# Patient Record
Sex: FEMALE | Race: WHITE | NOT HISPANIC OR LATINO
[De-identification: names, ages, dates, MRNs, and addresses within clinical notes are randomized per-mention and may not be internally consistent; named-entity substitution may affect disease eponyms.]

---

## 2017-05-31 ENCOUNTER — APPOINTMENT (OUTPATIENT)
Age: 57
End: 2017-05-31

## 2017-06-05 ENCOUNTER — APPOINTMENT (OUTPATIENT)
Age: 57
End: 2017-06-05

## 2017-06-05 VITALS
HEIGHT: 64 IN | WEIGHT: 127 LBS | TEMPERATURE: 98.1 F | SYSTOLIC BLOOD PRESSURE: 148 MMHG | HEART RATE: 86 BPM | RESPIRATION RATE: 16 BRPM | BODY MASS INDEX: 21.68 KG/M2 | DIASTOLIC BLOOD PRESSURE: 69 MMHG

## 2017-06-05 DIAGNOSIS — B18.2 CHRONIC VIRAL HEPATITIS C: ICD-10-CM

## 2017-09-14 ENCOUNTER — CHART COPY (OUTPATIENT)
Age: 57
End: 2017-09-14

## 2017-12-13 ENCOUNTER — APPOINTMENT (OUTPATIENT)
Age: 57
End: 2017-12-13
Payer: COMMERCIAL

## 2017-12-13 VITALS
HEIGHT: 64 IN | HEART RATE: 99 BPM | WEIGHT: 126 LBS | SYSTOLIC BLOOD PRESSURE: 178 MMHG | TEMPERATURE: 98.2 F | BODY MASS INDEX: 21.51 KG/M2 | DIASTOLIC BLOOD PRESSURE: 103 MMHG | RESPIRATION RATE: 17 BRPM

## 2017-12-13 PROCEDURE — 99214 OFFICE O/P EST MOD 30 MIN: CPT

## 2017-12-13 RX ORDER — LISINOPRIL 30 MG/1
30 TABLET ORAL
Qty: 30 | Refills: 0 | Status: DISCONTINUED | COMMUNITY
Start: 2017-08-09 | End: 2017-12-13

## 2017-12-13 RX ORDER — LISINOPRIL 40 MG/1
40 TABLET ORAL
Qty: 30 | Refills: 0 | Status: ACTIVE | COMMUNITY
Start: 2017-11-09

## 2018-06-20 ENCOUNTER — APPOINTMENT (OUTPATIENT)
Dept: HEPATOLOGY | Facility: CLINIC | Age: 58
End: 2018-06-20
Payer: COMMERCIAL

## 2018-06-20 VITALS
HEART RATE: 70 BPM | TEMPERATURE: 98.4 F | HEIGHT: 64 IN | OXYGEN SATURATION: 97 % | SYSTOLIC BLOOD PRESSURE: 151 MMHG | BODY MASS INDEX: 21.51 KG/M2 | WEIGHT: 126 LBS | RESPIRATION RATE: 17 BRPM | DIASTOLIC BLOOD PRESSURE: 80 MMHG

## 2018-06-20 PROCEDURE — 99214 OFFICE O/P EST MOD 30 MIN: CPT

## 2018-08-28 ENCOUNTER — OUTPATIENT (OUTPATIENT)
Dept: OUTPATIENT SERVICES | Facility: HOSPITAL | Age: 58
LOS: 1 days | End: 2018-08-28
Payer: COMMERCIAL

## 2018-08-28 ENCOUNTER — APPOINTMENT (OUTPATIENT)
Dept: HEPATOLOGY | Facility: HOSPITAL | Age: 58
End: 2018-08-28

## 2018-08-28 DIAGNOSIS — K74.60 UNSPECIFIED CIRRHOSIS OF LIVER: ICD-10-CM

## 2018-08-28 PROCEDURE — 43235 EGD DIAGNOSTIC BRUSH WASH: CPT

## 2018-08-28 PROCEDURE — 43235 EGD DIAGNOSTIC BRUSH WASH: CPT | Mod: GC

## 2018-09-24 ENCOUNTER — APPOINTMENT (OUTPATIENT)
Dept: HEPATOLOGY | Facility: CLINIC | Age: 58
End: 2018-09-24
Payer: COMMERCIAL

## 2018-09-24 VITALS
SYSTOLIC BLOOD PRESSURE: 143 MMHG | RESPIRATION RATE: 16 BRPM | HEART RATE: 69 BPM | TEMPERATURE: 98 F | OXYGEN SATURATION: 91 % | DIASTOLIC BLOOD PRESSURE: 83 MMHG | HEIGHT: 62.5 IN | WEIGHT: 126 LBS | BODY MASS INDEX: 22.61 KG/M2

## 2018-09-24 PROCEDURE — 99214 OFFICE O/P EST MOD 30 MIN: CPT

## 2018-12-03 ENCOUNTER — MED ADMIN CHARGE (OUTPATIENT)
Age: 58
End: 2018-12-03

## 2018-12-19 ENCOUNTER — APPOINTMENT (OUTPATIENT)
Dept: HEPATOLOGY | Facility: CLINIC | Age: 58
End: 2018-12-19
Payer: COMMERCIAL

## 2018-12-19 VITALS
DIASTOLIC BLOOD PRESSURE: 88 MMHG | RESPIRATION RATE: 16 BRPM | HEIGHT: 62.5 IN | SYSTOLIC BLOOD PRESSURE: 154 MMHG | HEART RATE: 73 BPM | TEMPERATURE: 98.2 F

## 2018-12-19 PROCEDURE — 91200 LIVER ELASTOGRAPHY: CPT

## 2018-12-19 PROCEDURE — ZZZZZ: CPT

## 2018-12-19 PROCEDURE — 99214 OFFICE O/P EST MOD 30 MIN: CPT | Mod: 25

## 2019-06-19 ENCOUNTER — APPOINTMENT (OUTPATIENT)
Dept: HEPATOLOGY | Facility: CLINIC | Age: 59
End: 2019-06-19
Payer: COMMERCIAL

## 2019-06-19 VITALS
HEART RATE: 71 BPM | BODY MASS INDEX: 21.53 KG/M2 | DIASTOLIC BLOOD PRESSURE: 81 MMHG | RESPIRATION RATE: 16 BRPM | WEIGHT: 120 LBS | TEMPERATURE: 98.2 F | SYSTOLIC BLOOD PRESSURE: 156 MMHG | HEIGHT: 62.5 IN

## 2019-06-19 PROCEDURE — 99214 OFFICE O/P EST MOD 30 MIN: CPT

## 2019-07-16 ENCOUNTER — APPOINTMENT (OUTPATIENT)
Dept: HEPATOLOGY | Facility: HOSPITAL | Age: 59
End: 2019-07-16

## 2019-07-16 ENCOUNTER — OUTPATIENT (OUTPATIENT)
Dept: OUTPATIENT SERVICES | Facility: HOSPITAL | Age: 59
LOS: 1 days | End: 2019-07-16
Payer: COMMERCIAL

## 2019-07-16 DIAGNOSIS — I85.00 ESOPHAGEAL VARICES WITHOUT BLEEDING: ICD-10-CM

## 2019-07-16 PROCEDURE — C1889: CPT

## 2019-07-16 PROCEDURE — 43244 EGD VARICES LIGATION: CPT

## 2019-07-16 PROCEDURE — 43244 EGD VARICES LIGATION: CPT | Mod: GC

## 2019-10-22 ENCOUNTER — OUTPATIENT (OUTPATIENT)
Dept: OUTPATIENT SERVICES | Facility: HOSPITAL | Age: 59
LOS: 1 days | End: 2019-10-22
Payer: COMMERCIAL

## 2019-10-22 ENCOUNTER — APPOINTMENT (OUTPATIENT)
Dept: HEPATOLOGY | Facility: HOSPITAL | Age: 59
End: 2019-10-22

## 2019-10-22 DIAGNOSIS — I85.00 ESOPHAGEAL VARICES WITHOUT BLEEDING: ICD-10-CM

## 2019-10-22 PROCEDURE — 43235 EGD DIAGNOSTIC BRUSH WASH: CPT | Mod: GC

## 2019-10-22 PROCEDURE — 43235 EGD DIAGNOSTIC BRUSH WASH: CPT

## 2019-10-25 ENCOUNTER — MEDICATION RENEWAL (OUTPATIENT)
Age: 59
End: 2019-10-25

## 2020-01-01 ENCOUNTER — MOBILE ON CALL (OUTPATIENT)
Age: 60
End: 2020-01-01

## 2020-01-23 ENCOUNTER — RX RENEWAL (OUTPATIENT)
Age: 60
End: 2020-01-23

## 2020-02-03 ENCOUNTER — APPOINTMENT (OUTPATIENT)
Dept: HEPATOLOGY | Facility: CLINIC | Age: 60
End: 2020-02-03

## 2020-03-06 ENCOUNTER — APPOINTMENT (OUTPATIENT)
Dept: HEPATOLOGY | Facility: CLINIC | Age: 60
End: 2020-03-06

## 2020-03-23 RX ORDER — CARVEDILOL 6.25 MG/1
6.25 TABLET, FILM COATED ORAL
Qty: 30 | Refills: 5 | Status: DISCONTINUED | COMMUNITY
Start: 2018-08-28 | End: 2020-03-23

## 2020-04-16 ENCOUNTER — TRANSCRIPTION ENCOUNTER (OUTPATIENT)
Age: 60
End: 2020-04-16

## 2020-04-16 ENCOUNTER — APPOINTMENT (OUTPATIENT)
Dept: HEPATOLOGY | Facility: CLINIC | Age: 60
End: 2020-04-16
Payer: COMMERCIAL

## 2020-04-16 PROCEDURE — 99214 OFFICE O/P EST MOD 30 MIN: CPT | Mod: 95

## 2020-04-16 RX ORDER — POTASSIUM GLUCONATE 595(99)MG
10 MCG TABLET ORAL
Refills: 0 | Status: ACTIVE | COMMUNITY
Start: 2020-04-16

## 2020-04-30 ENCOUNTER — APPOINTMENT (OUTPATIENT)
Dept: HEPATOLOGY | Facility: CLINIC | Age: 60
End: 2020-04-30

## 2020-12-05 ENCOUNTER — APPOINTMENT (OUTPATIENT)
Dept: DISASTER EMERGENCY | Facility: CLINIC | Age: 60
End: 2020-12-05

## 2020-12-06 LAB — SARS-COV-2 N GENE NPH QL NAA+PROBE: NOT DETECTED

## 2020-12-08 ENCOUNTER — APPOINTMENT (OUTPATIENT)
Dept: HEPATOLOGY | Facility: HOSPITAL | Age: 60
End: 2020-12-08

## 2021-03-13 ENCOUNTER — APPOINTMENT (OUTPATIENT)
Dept: DISASTER EMERGENCY | Facility: CLINIC | Age: 61
End: 2021-03-13

## 2021-03-14 LAB — SARS-COV-2 N GENE NPH QL NAA+PROBE: NOT DETECTED

## 2021-03-16 ENCOUNTER — OUTPATIENT (OUTPATIENT)
Dept: OUTPATIENT SERVICES | Facility: HOSPITAL | Age: 61
LOS: 1 days | Discharge: HOME | End: 2021-03-16
Payer: COMMERCIAL

## 2021-03-16 ENCOUNTER — APPOINTMENT (OUTPATIENT)
Dept: HEPATOLOGY | Facility: HOSPITAL | Age: 61
End: 2021-03-16

## 2021-03-16 VITALS
OXYGEN SATURATION: 98 % | RESPIRATION RATE: 17 BRPM | HEART RATE: 60 BPM | DIASTOLIC BLOOD PRESSURE: 70 MMHG | SYSTOLIC BLOOD PRESSURE: 120 MMHG

## 2021-03-16 VITALS
HEIGHT: 63 IN | OXYGEN SATURATION: 95 % | WEIGHT: 121.92 LBS | SYSTOLIC BLOOD PRESSURE: 139 MMHG | HEART RATE: 70 BPM | DIASTOLIC BLOOD PRESSURE: 74 MMHG | TEMPERATURE: 98 F | RESPIRATION RATE: 16 BRPM

## 2021-03-16 DIAGNOSIS — I85.00 ESOPHAGEAL VARICES WITHOUT BLEEDING: ICD-10-CM

## 2021-03-16 PROCEDURE — 43244 EGD VARICES LIGATION: CPT

## 2021-03-16 PROCEDURE — C1889: CPT

## 2021-03-16 PROCEDURE — 43244 EGD VARICES LIGATION: CPT | Mod: GC

## 2021-03-16 RX ORDER — SODIUM CHLORIDE 9 MG/ML
500 INJECTION INTRAMUSCULAR; INTRAVENOUS; SUBCUTANEOUS
Refills: 0 | Status: COMPLETED | OUTPATIENT
Start: 2021-03-16 | End: 2021-03-16

## 2021-03-16 RX ADMIN — SODIUM CHLORIDE 75 MILLILITER(S): 9 INJECTION INTRAMUSCULAR; INTRAVENOUS; SUBCUTANEOUS at 11:23

## 2021-03-16 NOTE — ASU PATIENT PROFILE, ADULT - PMH
Heart murmur    Hepatic cirrhosis, unspecified hepatic cirrhosis type, unspecified whether ascites present     Heart murmur    Hepatic cirrhosis, unspecified hepatic cirrhosis type, unspecified whether ascites present    Hypertension, unspecified type

## 2021-03-16 NOTE — ASU PREOP CHECKLIST - WARM FLUIDS/WARM BLANKETS
no
General Sunscreen Counseling: I recommended a broad spectrum sunscreen with a SPF of 30 or higher.  I explained that SPF 30 sunscreens block approximately 97 percent of the sun's harmful rays.  Sunscreens should be applied at least 15 minutes prior to expected sun exposure and then every 2 hours after that as long as sun exposure continues. If swimming or exercising sunscreen should be reapplied every 45 minutes to an hour after getting wet or sweating.  One ounce, or the equivalent of a shot glass full of sunscreen, is adequate to protect the skin not covered by a bathing suit. I also recommended a lip balm with a sunscreen as well. Sun protective clothing can be used in lieu of sunscreen but must be worn the entire time you are exposed to the sun's rays.
Products Recommended: [employer] as directed
Detail Level: Detailed

## 2021-03-22 ENCOUNTER — RX RENEWAL (OUTPATIENT)
Age: 61
End: 2021-03-22

## 2021-03-30 ENCOUNTER — RX RENEWAL (OUTPATIENT)
Age: 61
End: 2021-03-30

## 2021-05-18 PROBLEM — R01.1 CARDIAC MURMUR, UNSPECIFIED: Chronic | Status: ACTIVE | Noted: 2021-03-16

## 2021-05-18 PROBLEM — K74.60 UNSPECIFIED CIRRHOSIS OF LIVER: Chronic | Status: ACTIVE | Noted: 2021-03-16

## 2021-05-18 PROBLEM — I10 ESSENTIAL (PRIMARY) HYPERTENSION: Chronic | Status: ACTIVE | Noted: 2021-03-16

## 2021-06-23 ENCOUNTER — APPOINTMENT (OUTPATIENT)
Dept: HEPATOLOGY | Facility: CLINIC | Age: 61
End: 2021-06-23
Payer: COMMERCIAL

## 2021-06-23 VITALS
BODY MASS INDEX: 22.08 KG/M2 | DIASTOLIC BLOOD PRESSURE: 84 MMHG | HEIGHT: 62 IN | WEIGHT: 120 LBS | SYSTOLIC BLOOD PRESSURE: 142 MMHG | OXYGEN SATURATION: 97 % | HEART RATE: 65 BPM | TEMPERATURE: 98.2 F

## 2021-06-23 PROCEDURE — 99214 OFFICE O/P EST MOD 30 MIN: CPT

## 2021-06-23 PROCEDURE — 99072 ADDL SUPL MATRL&STAF TM PHE: CPT

## 2021-08-03 ENCOUNTER — RX RENEWAL (OUTPATIENT)
Age: 61
End: 2021-08-03

## 2021-08-21 ENCOUNTER — APPOINTMENT (OUTPATIENT)
Dept: DISASTER EMERGENCY | Facility: CLINIC | Age: 61
End: 2021-08-21

## 2021-08-21 DIAGNOSIS — Z01.818 ENCOUNTER FOR OTHER PREPROCEDURAL EXAMINATION: ICD-10-CM

## 2021-08-22 LAB — SARS-COV-2 N GENE NPH QL NAA+PROBE: NOT DETECTED

## 2021-08-24 ENCOUNTER — APPOINTMENT (OUTPATIENT)
Dept: HEPATOLOGY | Facility: HOSPITAL | Age: 61
End: 2021-08-24

## 2021-08-24 ENCOUNTER — OUTPATIENT (OUTPATIENT)
Dept: OUTPATIENT SERVICES | Facility: HOSPITAL | Age: 61
LOS: 1 days | End: 2021-08-24
Payer: COMMERCIAL

## 2021-08-24 VITALS
HEIGHT: 63 IN | HEART RATE: 76 BPM | RESPIRATION RATE: 12 BRPM | OXYGEN SATURATION: 97 % | DIASTOLIC BLOOD PRESSURE: 95 MMHG | WEIGHT: 121.92 LBS | SYSTOLIC BLOOD PRESSURE: 149 MMHG | TEMPERATURE: 97 F

## 2021-08-24 VITALS
SYSTOLIC BLOOD PRESSURE: 139 MMHG | HEART RATE: 64 BPM | OXYGEN SATURATION: 99 % | RESPIRATION RATE: 15 BRPM | DIASTOLIC BLOOD PRESSURE: 65 MMHG

## 2021-08-24 DIAGNOSIS — K74.60 UNSPECIFIED CIRRHOSIS OF LIVER: ICD-10-CM

## 2021-08-24 PROCEDURE — 43235 EGD DIAGNOSTIC BRUSH WASH: CPT | Mod: GC

## 2021-08-24 PROCEDURE — 43235 EGD DIAGNOSTIC BRUSH WASH: CPT

## 2021-08-24 RX ORDER — CARVEDILOL PHOSPHATE 80 MG/1
0 CAPSULE, EXTENDED RELEASE ORAL
Qty: 0 | Refills: 0 | DISCHARGE

## 2021-08-24 RX ORDER — LISINOPRIL 2.5 MG/1
1 TABLET ORAL
Qty: 0 | Refills: 0 | DISCHARGE

## 2021-08-24 RX ORDER — VALACYCLOVIR 500 MG/1
0 TABLET, FILM COATED ORAL
Qty: 0 | Refills: 0 | DISCHARGE

## 2021-08-24 RX ORDER — BENZOYL PEROXIDE MICRONIZED 5.8 %
0 TOWELETTE (EA) TOPICAL
Qty: 0 | Refills: 0 | DISCHARGE

## 2021-08-24 RX ORDER — SODIUM CHLORIDE 9 MG/ML
500 INJECTION INTRAMUSCULAR; INTRAVENOUS; SUBCUTANEOUS
Refills: 0 | Status: COMPLETED | OUTPATIENT
Start: 2021-08-24 | End: 2021-08-24

## 2021-08-24 RX ORDER — POTASSIUM CHLORIDE 20 MEQ
0 PACKET (EA) ORAL
Qty: 0 | Refills: 0 | DISCHARGE

## 2021-08-24 RX ORDER — CHOLECALCIFEROL (VITAMIN D3) 125 MCG
500 CAPSULE ORAL
Qty: 0 | Refills: 0 | DISCHARGE

## 2021-08-24 RX ADMIN — SODIUM CHLORIDE 75 MILLILITER(S): 9 INJECTION INTRAMUSCULAR; INTRAVENOUS; SUBCUTANEOUS at 12:22

## 2021-08-24 NOTE — PRE-ANESTHESIA EVALUATION ADULT - ANESTHESIA, PREVIOUS REACTION, PROFILE
Pt ambulated to triage with c/o   Chief Complaint   Patient presents with   • Abdominal Pain     upper abd into back and under ribs.  reports some SOB and chest pressure; pt has been having abd pain for months and has been getting treatment from PCP - started on pantoprazole, metronidazole and tetracycline    • N/V     no blood in vomit.      utilized #[id number].  Recent dx of gallstones.  Pt reports pain 5/10.  Called for EKG.      Pt Informed regarding triage process and verbalized understanding to inform triage tech or RN for any changes in condition. Placed in lobby.    
nausea/vomiting

## 2021-08-24 NOTE — ASU PATIENT PROFILE, ADULT - NSICDXPASTMEDICALHX_GEN_ALL_CORE_FT
PAST MEDICAL HISTORY:  Heart murmur     Hepatic cirrhosis, unspecified hepatic cirrhosis type, unspecified whether ascites present     Hypertension, unspecified type

## 2021-08-24 NOTE — PRE PROCEDURE NOTE - PRE PROCEDURE EVALUATION
Attending Physician:  Rebekah                      Procedure:    Indication for Procedure:  ________________________________________________________  PAST MEDICAL & SURGICAL HISTORY:  Heart murmur    Hepatic cirrhosis, unspecified hepatic cirrhosis type, unspecified whether ascites present    Hypertension, unspecified type      ALLERGIES:  Imitrex (Swelling)    HOME MEDICATIONS:  carvedilol 6.25 mg oral tablet: orally once a day  hydroCHLOROthiazide 12.5 mg oral tablet: 1 tab(s) orally once a day  lisinopril 40 mg oral tablet: 1 tab(s) orally once a day  potassium chloride 10 mEq oral capsule, extended release: orally once a day  vit c: 1000 milligram(s) orally once a day    AICD/PPM: [ ] yes   [ ] no    PERTINENT LAB DATA:                      PHYSICAL EXAMINATION:    T(C): --  HR: --  BP: --  RR: --  SpO2: --    Constitutional: NAD  HEENT: PERRLA, EOMI,    Neck:  No JVD  Respiratory: CTAB/L  Cardiovascular: S1 and S2  Gastrointestinal: BS+, soft, NT/ND  Extremities: No peripheral edema  Neurological: A/O x 3, no focal deficits  Psychiatric: Normal mood, normal affect  Skin: No rashes    ASA Class: I [ ]  II [X ]  III [ ]  IV [ ]    COMMENTS:    The patient is a suitable candidate for the planned procedure unless box checked [ ]  No, explain:    
Attending Physician:  Rebekah                      Procedure:    Indication for Procedure:  ________________________________________________________  PAST MEDICAL & SURGICAL HISTORY:    ALLERGIES:    HOME MEDICATIONS:    AICD/PPM: [ ] yes   [ ] no    PERTINENT LAB DATA:                      PHYSICAL EXAMINATION:    T(C): --  HR: --  BP: --  RR: --  SpO2: --    Constitutional: NAD  HEENT: PERRLA, EOMI,    Neck:  No JVD  Respiratory: CTAB/L  Cardiovascular: S1 and S2  Gastrointestinal: BS+, soft, NT/ND  Extremities: No peripheral edema  Neurological: A/O x 3, no focal deficits  Psychiatric: Normal mood, normal affect  Skin: No rashes    ASA Class: I [ ]  II [ X]  III [ ]  IV [ ]    COMMENTS:    The patient is a suitable candidate for the planned procedure unless box checked [ ]  No, explain:

## 2021-12-01 ENCOUNTER — RX RENEWAL (OUTPATIENT)
Age: 61
End: 2021-12-01

## 2022-03-31 ENCOUNTER — RX RENEWAL (OUTPATIENT)
Age: 62
End: 2022-03-31

## 2022-04-28 ENCOUNTER — APPOINTMENT (OUTPATIENT)
Dept: HEPATOLOGY | Facility: CLINIC | Age: 62
End: 2022-04-28
Payer: COMMERCIAL

## 2022-04-28 VITALS
SYSTOLIC BLOOD PRESSURE: 146 MMHG | HEIGHT: 64 IN | BODY MASS INDEX: 22.2 KG/M2 | RESPIRATION RATE: 16 BRPM | DIASTOLIC BLOOD PRESSURE: 81 MMHG | WEIGHT: 130 LBS | HEART RATE: 61 BPM | OXYGEN SATURATION: 98 % | TEMPERATURE: 97.5 F

## 2022-04-28 PROCEDURE — 99213 OFFICE O/P EST LOW 20 MIN: CPT

## 2022-11-28 ENCOUNTER — APPOINTMENT (OUTPATIENT)
Dept: HEPATOLOGY | Facility: CLINIC | Age: 62
End: 2022-11-28

## 2022-11-28 VITALS — DIASTOLIC BLOOD PRESSURE: 92 MMHG | HEART RATE: 68 BPM | SYSTOLIC BLOOD PRESSURE: 159 MMHG

## 2022-11-28 VITALS
DIASTOLIC BLOOD PRESSURE: 95 MMHG | SYSTOLIC BLOOD PRESSURE: 166 MMHG | HEART RATE: 75 BPM | RESPIRATION RATE: 15 BRPM | HEIGHT: 64 IN | BODY MASS INDEX: 21.68 KG/M2 | WEIGHT: 127 LBS

## 2022-11-28 PROCEDURE — 99214 OFFICE O/P EST MOD 30 MIN: CPT

## 2022-11-28 RX ORDER — HYDROCHLOROTHIAZIDE 12.5 MG/1
12.5 CAPSULE ORAL
Qty: 30 | Refills: 0 | Status: ACTIVE | COMMUNITY
Start: 2022-09-09

## 2022-11-28 RX ORDER — HYDROCHLOROTHIAZIDE 12.5 MG/1
12.5 TABLET ORAL
Qty: 30 | Refills: 0 | Status: DISCONTINUED | COMMUNITY
Start: 2017-08-09 | End: 2022-11-28

## 2022-11-28 RX ORDER — UREA 10 %
140 (45 FE) LOTION (ML) TOPICAL
Qty: 30 | Refills: 0 | Status: ACTIVE | COMMUNITY
Start: 2022-09-12

## 2022-11-28 RX ORDER — CHLORHEXIDINE GLUCONATE 4 %
1000 LIQUID (ML) TOPICAL
Qty: 90 | Refills: 0 | Status: ACTIVE | COMMUNITY
Start: 2022-09-12

## 2022-11-28 RX ORDER — POTASSIUM CHLORIDE 750 MG/1
10 TABLET, FILM COATED, EXTENDED RELEASE ORAL
Qty: 30 | Refills: 0 | Status: ACTIVE | COMMUNITY
Start: 2022-11-04

## 2022-11-28 RX ORDER — CYCLOBENZAPRINE HYDROCHLORIDE 5 MG/1
5 TABLET, FILM COATED ORAL
Qty: 20 | Refills: 0 | Status: DISCONTINUED | COMMUNITY
Start: 2017-11-09 | End: 2022-11-28

## 2022-11-28 RX ORDER — VALACYCLOVIR 1 G/1
1 TABLET, FILM COATED ORAL
Qty: 4 | Refills: 0 | Status: ACTIVE | COMMUNITY
Start: 2022-11-07

## 2022-11-28 RX ORDER — POTASSIUM CHLORIDE 750 MG/1
10 TABLET, EXTENDED RELEASE ORAL
Qty: 30 | Refills: 0 | Status: DISCONTINUED | COMMUNITY
Start: 2022-08-08

## 2022-11-28 RX ORDER — AZELAIC ACID 0.15 G/G
15 GEL TOPICAL
Qty: 50 | Refills: 0 | Status: ACTIVE | COMMUNITY
Start: 2022-09-06

## 2022-11-28 RX ORDER — AZELAIC ACID 0.15 G/G
15 AEROSOL, FOAM TOPICAL
Qty: 50 | Refills: 0 | Status: ACTIVE | COMMUNITY
Start: 2022-09-15

## 2022-11-28 NOTE — HISTORY OF PRESENT ILLNESS
[de-identified] : Rosie Workman 62 yoF comes in for follow up for cirrhosis secondary to hepatitis C s/p treatment in 2015. She  is doing well.  Denies abdominal pain, nausea, vomiting, melena, hematochezia, or hematemesis. No LLE. No mental status changes. She has low WBC and is being followed by hematologist, unclear etiology. \par \par She has a history of esophageal varices and is currently taking Coreg.  She underwent an upper endoscopy with 2 bands placed March 16, 2021. Repeat endoscopy August 24, 2021 showed grade 1 varices not amenable to banding. \par \par EGD 7/16/19 showed grade III esophageal varices, banded. \par EGD 8/28/18 showing grade 3 varices. \par \par She was treated with PegINF + RBV for HCV in the past and was a relapser. She started Harvoni on 4/16/15 and completed treatment on October 5, 2015 and is a sustained viral responder. \par \par Abdo US 9/27/22 showed no hepatic lesion. \par BW 9/30/22 INR 1.1, AFP 3.8, ALT 18, AST 28, TB 0.6, plts 54,000, WBC 2.3. \par \par Abdominal sonogram June 9, 2021 revealed a cirrhotic liver without lesions.  Blood tests June 9, 2021 ALT 26, AST 25, alkaline phosphatase 81, albumin 4.7, total bilirubin 1.7, platelet count 61,000, INR 1.18, alpha-fetoprotein 4\par \par

## 2022-11-28 NOTE — PHYSICAL EXAM
[Liver Size (___ Cm)] : Liver size [unfilled] cm [Non-Tender] : non-tender [General Appearance - Alert] : alert [General Appearance - In No Acute Distress] : in no acute distress [Sclera] : the sclera and conjunctiva were normal [] : no respiratory distress [Auscultation Breath Sounds / Voice Sounds] : lungs were clear to auscultation bilaterally [Heart Rate And Rhythm] : heart rate was normal and rhythm regular [Heart Sounds] : normal S1 and S2 [Heart Sounds Gallop] : no gallops [Murmurs] : no murmurs [Heart Sounds Pericardial Friction Rub] : no pericardial rub [Edema] : there was no peripheral edema [Bowel Sounds] : normal bowel sounds [Abdomen Soft] : soft [Abdomen Tenderness] : non-tender [Abdomen Mass (___ Cm)] : no abdominal mass palpated [Oriented To Time, Place, And Person] : oriented to person, place, and time [Affect] : the affect was normal [Scleral Icterus] : No Scleral Icterus [Hepatojugular Reflux] : patient did not have a sustained hepatojugular reflux [Spider Angioma] : No spider angioma(s) were observed [Abdominal Bruit] : no abdominal bruit [Splenomegaly] : no splenomegaly [Asterixis] : no asterixis observed [Jaundice] : No jaundice [Palmar Erythema] : no Palmar Erythema [Respiration, Rhythm And Depth] : normal respiratory rhythm and effort

## 2022-11-28 NOTE — ASSESSMENT
[FreeTextEntry1] : 62-year-old woman with cirrhosis secondary to hepatitis C who has been sustained viral responder to treatment.  \par \par # Cirrhosis\par -Secondary to HCV. She was treated with Harvoni X 24 weeks completed treatment on October 5, 2015 and is a sustained viral responder. \par -I discussed the meaning of cirrhosis with the patient. I reviewed the natural history of the disease. I explained the risks for the development of esophageal varices with and without bleeding, hepatic encephalopathy, ascites, hepatocellular carcinoma, and liver failure. I have explained that disease can progress to the point of requiring an evaluation for liver transplantation. I have explained the need for imaging every 6 months to screen for liver cancer.\par \par #Cirrhosis Surveillance\par  > Esophageal / gastric varices -  esophageal varices with multiple banding and is currently taking Coreg 6.25 mg daily.  She underwent an upper endoscopy with 2 bands placed March 16, 2021. Repeat endoscopy August 24, 2021 showed grade 1 varices not amenable to banding. Recommended a repeat EGD. \par \par  > Ascites - none\par \par  > Hepatic encephalopathy - none on exam today \par \par  > HCC screening - none on abdo US Sept 2022 with normal AFP\par \par  > Portal vein thrombosis - none\par \par  > Transplant candidate - MELD Na too low\par \par Plan:\par Repeat EGD, RTC in 6 months with repeat BW and abdo US. \par

## 2022-12-20 ENCOUNTER — LABORATORY RESULT (OUTPATIENT)
Age: 62
End: 2022-12-20

## 2022-12-20 ENCOUNTER — APPOINTMENT (OUTPATIENT)
Dept: OBGYN | Facility: CLINIC | Age: 62
End: 2022-12-20

## 2022-12-20 VITALS
DIASTOLIC BLOOD PRESSURE: 78 MMHG | BODY MASS INDEX: 21 KG/M2 | SYSTOLIC BLOOD PRESSURE: 142 MMHG | HEIGHT: 64 IN | TEMPERATURE: 98.5 F | WEIGHT: 123 LBS

## 2022-12-20 DIAGNOSIS — Z86.19 PERSONAL HISTORY OF OTHER INFECTIOUS AND PARASITIC DISEASES: ICD-10-CM

## 2022-12-20 DIAGNOSIS — Z87.19 PERSONAL HISTORY OF OTHER DISEASES OF THE DIGESTIVE SYSTEM: ICD-10-CM

## 2022-12-20 DIAGNOSIS — N93.0 POSTCOITAL AND CONTACT BLEEDING: ICD-10-CM

## 2022-12-20 DIAGNOSIS — E70.39: ICD-10-CM

## 2022-12-20 DIAGNOSIS — Z82.49 FAMILY HISTORY OF ISCHEMIC HEART DISEASE AND OTHER DISEASES OF THE CIRCULATORY SYSTEM: ICD-10-CM

## 2022-12-20 DIAGNOSIS — R92.8 OTHER ABNORMAL AND INCONCLUSIVE FINDINGS ON DIAGNOSTIC IMAGING OF BREAST: ICD-10-CM

## 2022-12-20 DIAGNOSIS — Z00.00 ENCOUNTER FOR GENERAL ADULT MEDICAL EXAMINATION W/OUT ABNORMAL FINDINGS: ICD-10-CM

## 2022-12-20 DIAGNOSIS — Z80.3 FAMILY HISTORY OF MALIGNANT NEOPLASM OF BREAST: ICD-10-CM

## 2022-12-20 PROCEDURE — 99202 OFFICE O/P NEW SF 15 MIN: CPT

## 2022-12-20 PROCEDURE — 99386 PREV VISIT NEW AGE 40-64: CPT

## 2022-12-20 RX ORDER — ASCORBIC ACID 500 MG
TABLET ORAL
Refills: 0 | Status: ACTIVE | COMMUNITY

## 2022-12-20 NOTE — PHYSICAL EXAM
[Appropriately responsive] : appropriately responsive [Alert] : alert [No Acute Distress] : no acute distress [No Lymphadenopathy] : no lymphadenopathy [Soft] : soft [Non-tender] : non-tender [Non-distended] : non-distended [No HSM] : No HSM [No Lesions] : no lesions [No Mass] : no mass [Oriented x3] : oriented x3 [Examination Of The Breasts] : a normal appearance [No Masses] : no breast masses were palpable [Labia Majora] : normal [Labia Minora] : normal [Normal] : normal [Uterine Adnexae] : normal [Declined] : Patient declined rectal exam [FreeTextEntry9] : has colonoscopy scheduled for next month

## 2022-12-20 NOTE — DISCUSSION/SUMMARY
[FreeTextEntry1] : 63yo  PMF is here for GYN intake with PCB and vag dryness. \par \par PCB/vag dryness: \par - TVUS and MD visit \par - Will hold on any vag HRT until PCB worked up \par \par BIRADS 3 mammo/sono:\par - Rx given today for L Dx mammo/sono today \par \par RHM: \par - DVS neg x 3\par - Dentist, PCP, Derm as discussed \par - Rx given for DEXA, mammo/sono\par - Colonoscopy as discussed -- appt next month \par - Offered STI screen today. Pt declined \par - Encouraged healthy diet, exercise, weight maint. \par \par Jessica Henderson MD \par Obstetrician/Gynecologist\par

## 2022-12-20 NOTE — HISTORY OF PRESENT ILLNESS
[Patient reported PAP Smear was normal] : Patient reported PAP Smear was normal [Patient reported bone density results were normal] : Patient reported bone density results were normal [Patient reported colonoscopy was normal] : Patient reported colonoscopy was normal [FreeTextEntry1] : 63yo  PMF is here for GYN intake. Pt reports from dryness with intercourse even with lube. She has PCB even with lube. Started several months ago.  [Mammogramdate] : 5/2021 [TextBox_19] : 14mm calcified lymph node in L axilla. needs 6 month mammo/sono [PapSmeardate] : 2021 [BoneDensityDate] : 8 yrs ago  [ColonoscopyDate] : 2020 [TextBox_43] : due in process of finding new GI MD

## 2022-12-22 LAB — HPV HIGH+LOW RISK DNA PNL CVX: NOT DETECTED

## 2023-01-07 LAB — CYTOLOGY CVX/VAG DOC THIN PREP: ABNORMAL

## 2023-02-07 ENCOUNTER — NON-APPOINTMENT (OUTPATIENT)
Age: 63
End: 2023-02-07

## 2023-02-07 ENCOUNTER — APPOINTMENT (OUTPATIENT)
Dept: ANTEPARTUM | Facility: CLINIC | Age: 63
End: 2023-02-07
Payer: COMMERCIAL

## 2023-02-07 ENCOUNTER — APPOINTMENT (OUTPATIENT)
Dept: OBGYN | Facility: CLINIC | Age: 63
End: 2023-02-07
Payer: COMMERCIAL

## 2023-02-07 ENCOUNTER — ASOB RESULT (OUTPATIENT)
Age: 63
End: 2023-02-07

## 2023-02-07 VITALS
HEIGHT: 62 IN | BODY MASS INDEX: 23 KG/M2 | SYSTOLIC BLOOD PRESSURE: 159 MMHG | WEIGHT: 125 LBS | DIASTOLIC BLOOD PRESSURE: 88 MMHG

## 2023-02-07 PROCEDURE — 76856 US EXAM PELVIC COMPLETE: CPT | Mod: 59

## 2023-02-07 PROCEDURE — 99212 OFFICE O/P EST SF 10 MIN: CPT

## 2023-02-07 PROCEDURE — 76830 TRANSVAGINAL US NON-OB: CPT

## 2023-02-07 NOTE — HISTORY OF PRESENT ILLNESS
[FreeTextEntry1] : 62yo  PMF is here for follow up and results review. Pt had TVUS today for PCB which demonstrated normal ovaries b/l and a 5mm EMS. She says her bleeding is better when she uses her cream (which she applies with an applicator) earlier in the day.

## 2023-02-07 NOTE — DISCUSSION/SUMMARY
[FreeTextEntry1] : 64yo  PMF with 5mm EMS and PCB. \par \par - RTO for Endosee and Endo bx at that time \par - Aware she cannot have anything in vagina for two weeks after procedure \par - All questions were solicited and answered \par \par Jessica Love MD \par Obstetrician/Gynecologist\par

## 2023-06-01 ENCOUNTER — APPOINTMENT (OUTPATIENT)
Dept: HEPATOLOGY | Facility: CLINIC | Age: 63
End: 2023-06-01
Payer: COMMERCIAL

## 2023-06-01 VITALS
BODY MASS INDEX: 23.37 KG/M2 | WEIGHT: 127 LBS | RESPIRATION RATE: 16 BRPM | HEART RATE: 67 BPM | OXYGEN SATURATION: 98 % | TEMPERATURE: 98 F | SYSTOLIC BLOOD PRESSURE: 152 MMHG | HEIGHT: 62 IN | DIASTOLIC BLOOD PRESSURE: 70 MMHG

## 2023-06-01 PROCEDURE — 99214 OFFICE O/P EST MOD 30 MIN: CPT

## 2023-06-01 NOTE — ASSESSMENT
[FreeTextEntry1] : 62-year-old woman with cirrhosis secondary to hepatitis C who has been sustained viral responder to treatment.  \par \par # Cirrhosis\par -Secondary to HCV. She was treated with Harvoni X 24 weeks completed treatment on October 5, 2015 and is a sustained viral responder. \par -I discussed the meaning of cirrhosis with the patient. I reviewed the natural history of the disease. I explained the risks for the development of esophageal varices with and without bleeding, hepatic encephalopathy, ascites, hepatocellular carcinoma, and liver failure. I have explained that disease can progress to the point of requiring an evaluation for liver transplantation. I have explained the need for imaging every 6 months to screen for liver cancer.\par \par #Cirrhosis Surveillance\par  > Esophageal / gastric varices -  esophageal varices with multiple banding and is currently taking Coreg 6.25 mg daily.  She underwent an upper endoscopy with 2 bands placed March 16, 2021. Repeat endoscopy August 24, 2021 showed grade 1 varices not amenable to banding. Recommended a repeat EGD. \par \par  > Ascites - none\par \par  > Hepatic encephalopathy - none on exam today \par \par  > HCC screening - none on abdo US 4/28/23 with normal AFP\par \par  > Portal vein thrombosis - none\par \par  > Transplant candidate - MELD Na too low\par \par #gallbladder sludge and stones, unchanged. No abdo pain unless eating fatty foods. Will continue to follow for now since she is high risk for surgery. \par \par Plan:\par Repeat EGD, RTC in 6 months with repeat BW and abdo US. \par

## 2023-06-01 NOTE — PHYSICAL EXAM
[Scleral Icterus] : No Scleral Icterus [Hepatojugular Reflux] : patient did not have a sustained hepatojugular reflux [Spider Angioma] : No spider angioma(s) were observed [Abdominal Bruit] : no abdominal bruit [Splenomegaly] : no splenomegaly [Liver Size (___ Cm)] : Liver size [unfilled] cm [Non-Tender] : non-tender [Asterixis] : no asterixis observed [Jaundice] : No jaundice [Palmar Erythema] : no Palmar Erythema [General Appearance - Alert] : alert [General Appearance - In No Acute Distress] : in no acute distress [Sclera] : the sclera and conjunctiva were normal [] : no respiratory distress [Respiration, Rhythm And Depth] : normal respiratory rhythm and effort [Auscultation Breath Sounds / Voice Sounds] : lungs were clear to auscultation bilaterally [Heart Rate And Rhythm] : heart rate was normal and rhythm regular [Heart Sounds] : normal S1 and S2 [Heart Sounds Gallop] : no gallops [Murmurs] : no murmurs [Heart Sounds Pericardial Friction Rub] : no pericardial rub [Edema] : there was no peripheral edema [Bowel Sounds] : normal bowel sounds [Abdomen Tenderness] : non-tender [Abdomen Soft] : soft [Abdomen Mass (___ Cm)] : no abdominal mass palpated [Oriented To Time, Place, And Person] : oriented to person, place, and time [Affect] : the affect was normal

## 2023-06-01 NOTE — HISTORY OF PRESENT ILLNESS
[de-identified] : Rosie Workman 63 yoF comes in for follow up for cirrhosis secondary to hepatitis C s/p treatment in 2015. She  is doing well.  Denies nausea, vomiting, melena, hematochezia, or hematemesis. No LLE. No mental status changes. She gets occasional RUQ discomfort when eats fatty foods. She has been busy watching her grandchildren. EGD was ordered in Nov to evaluate EV but  did not call to schedule appt. She is still taking Carvedilol 6.25 mg daily. \par BW on 4/28/23 ALT 20, AST 27, TB 0.5, CR 0.67, , HGB 12.5, plts 48,000, INR 1.2\par Abdo US 4/28/23 no hepatic lesions, gallbladder sludge and stones, unchanged. \par \par She has low WBC and is being followed by hematologist, unclear etiology. \par She has a history of esophageal varices and is currently taking Coreg.  She underwent an upper endoscopy with 2 bands placed March 16, 2021. Repeat endoscopy August 24, 2021 showed grade 1 varices not amenable to banding. \par \par EGD 7/16/19 showed grade III esophageal varices, banded. \par EGD 8/28/18 showing grade 3 varices. \par \par She was treated with PegINF + RBV for HCV in the past and was a relapser. She started Harvoni on 4/16/15 and completed treatment on October 5, 2015 and is a sustained viral responder. \par \par Abdo US 9/27/22 showed no hepatic lesion. \par BW 9/30/22 INR 1.1, AFP 3.8, ALT 18, AST 28, TB 0.6, plts 54,000, WBC 2.3. \par \par Abdominal sonogram June 9, 2021 revealed a cirrhotic liver without lesions.  Blood tests June 9, 2021 ALT 26, AST 25, alkaline phosphatase 81, albumin 4.7, total bilirubin 1.7, platelet count 61,000, INR 1.18, alpha-fetoprotein 4\par \par

## 2023-12-05 ENCOUNTER — APPOINTMENT (OUTPATIENT)
Dept: HEPATOLOGY | Facility: CLINIC | Age: 63
End: 2023-12-05

## 2024-01-02 ENCOUNTER — APPOINTMENT (OUTPATIENT)
Dept: HEPATOLOGY | Facility: CLINIC | Age: 64
End: 2024-01-02
Payer: COMMERCIAL

## 2024-01-02 VITALS
OXYGEN SATURATION: 98 % | TEMPERATURE: 97.5 F | WEIGHT: 123 LBS | HEART RATE: 70 BPM | HEIGHT: 55 IN | BODY MASS INDEX: 28.46 KG/M2 | SYSTOLIC BLOOD PRESSURE: 120 MMHG | DIASTOLIC BLOOD PRESSURE: 80 MMHG

## 2024-01-02 DIAGNOSIS — I85.00 ESOPHAGEAL VARICES W/OUT BLEEDING: ICD-10-CM

## 2024-01-02 DIAGNOSIS — K74.60 UNSPECIFIED CIRRHOSIS OF LIVER: ICD-10-CM

## 2024-01-02 DIAGNOSIS — Z86.19 PERSONAL HISTORY OF OTHER INFECTIOUS AND PARASITIC DISEASES: ICD-10-CM

## 2024-01-02 PROCEDURE — 99214 OFFICE O/P EST MOD 30 MIN: CPT

## 2024-01-02 RX ORDER — CARVEDILOL 6.25 MG/1
6.25 TABLET, FILM COATED ORAL
Qty: 180 | Refills: 1 | Status: ACTIVE | COMMUNITY
Start: 2018-08-28

## 2024-01-02 NOTE — PHYSICAL EXAM
[Scleral Icterus] : No Scleral Icterus [Hepatojugular Reflux] : patient did not have a sustained hepatojugular reflux [Spider Angioma] : No spider angioma(s) were observed [Abdominal Bruit] : no abdominal bruit [Splenomegaly] : no splenomegaly [Liver Size (___ Cm)] : Liver size [unfilled] cm [Non-Tender] : non-tender [Asterixis] : no asterixis observed [Jaundice] : No jaundice [Palmar Erythema] : no Palmar Erythema [General Appearance - Alert] : alert [General Appearance - In No Acute Distress] : in no acute distress [Sclera] : the sclera and conjunctiva were normal [Edema] : there was no peripheral edema [Abdomen Soft] : soft [Abdomen Tenderness] : non-tender [Abdomen Mass (___ Cm)] : no abdominal mass palpated [Oriented To Time, Place, And Person] : oriented to person, place, and time [Affect] : the affect was normal

## 2024-01-02 NOTE — ASSESSMENT
[FreeTextEntry1] : 63-year-old woman with cirrhosis secondary to hepatitis C who has been sustained viral responder to treatment.  #Cirrhosis -Secondary to HCV. She was treated with Harvoni X 24 weeks completed treatment on October 5, 2015 and is a sustained viral responder. -No signs of decomp, currently doing well.  -Pt will contact her physician to forward me BW and abdo US.   #Cirrhosis Surveillance  > Esophageal / gastric varices - esophageal varices with multiple banding. She underwent an upper endoscopy with 2 bands placed March 16, 2021. Repeat endoscopy August 24, 2021 showed grade 1 varices not amenable to banding. Recommended again a repeat EGD and she will get with her new GI. C/W Coreg 6.25 mg twice a day. HR 70 today.     > Ascites - none  > Hepatic encephalopathy - none on exam today  > HCC screening - none on abdo US 4/28/23 with normal AFP. She will forward me abdo US done recently.   > Portal vein thrombosis - none   # H/o gallbladder sludge and stones.  No abdo pain unless eating fatty foods.  Plan: Fax me recent BW and abdo US, pt will call to review. Repeat EGD.  RTC 6 months with another provider since I'm leaving practice.

## 2024-01-02 NOTE — HISTORY OF PRESENT ILLNESS
[de-identified] : Rosie Workman 63 yoF comes in for follow up for cirrhosis secondary to hepatitis C s/p treatment in 2015. She is doing well without complaints.  Denies nausea, vomiting, melena, hematochezia, or hematemesis. No LLE. No mental status changes. EGD was ordered in June to evaluate EV but  did not call to schedule appt. She is currently taking Carvedilol 6.25 mg twice a day, increased by cardiologist few months ago b/c BP was elevated. States that she recently got BW and abdo US with her other physician for this visit however no reports for review.   BW on 4/28/23 ALT 20, AST 27, TB 0.5, CR 0.67, , HGB 12.5, plts 48,000, INR 1.2 Abdo US 4/28/23 no hepatic lesions, gallbladder sludge and stones, unchanged.   She has low WBC and is being followed by hematologist, unclear etiology.  She has a history of esophageal varices and is currently taking Coreg.  She underwent an upper endoscopy with 2 bands placed March 16, 2021. Repeat endoscopy August 24, 2021 showed grade 1 varices not amenable to banding.   EGD 7/16/19 showed grade III esophageal varices, banded.  EGD 8/28/18 showing grade 3 varices.   She was treated with PegINF + RBV for HCV in the past and was a relapser. She started Harvoni on 4/16/15 and completed treatment on October 5, 2015 and is a sustained viral responder.   Abdo US 9/27/22 showed no hepatic lesion.  BW 9/30/22 INR 1.1, AFP 3.8, ALT 18, AST 28, TB 0.6, plts 54,000, WBC 2.3.   Abdominal sonogram June 9, 2021 revealed a cirrhotic liver without lesions.  Blood tests June 9, 2021 ALT 26, AST 25, alkaline phosphatase 81, albumin 4.7, total bilirubin 1.7, platelet count 61,000, INR 1.18, alpha-fetoprotein 4
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
